# Patient Record
Sex: MALE | Race: OTHER | NOT HISPANIC OR LATINO | URBAN - METROPOLITAN AREA
[De-identification: names, ages, dates, MRNs, and addresses within clinical notes are randomized per-mention and may not be internally consistent; named-entity substitution may affect disease eponyms.]

---

## 2021-01-25 ENCOUNTER — EMERGENCY (EMERGENCY)
Facility: HOSPITAL | Age: 41
LOS: 1 days | Discharge: HOME | End: 2021-01-25
Payer: COMMERCIAL

## 2021-01-25 VITALS
RESPIRATION RATE: 20 BRPM | TEMPERATURE: 98 F | HEART RATE: 61 BPM | OXYGEN SATURATION: 98 % | SYSTOLIC BLOOD PRESSURE: 118 MMHG | DIASTOLIC BLOOD PRESSURE: 50 MMHG

## 2021-01-25 LAB — SARS-COV-2 RNA SPEC QL NAA+PROBE: SIGNIFICANT CHANGE UP

## 2021-01-25 PROCEDURE — U0003: CPT

## 2021-01-25 PROCEDURE — 99283 EMERGENCY DEPT VISIT LOW MDM: CPT

## 2021-01-25 PROCEDURE — 99282 EMERGENCY DEPT VISIT SF MDM: CPT

## 2021-01-25 PROCEDURE — U0005: CPT

## 2021-01-25 NOTE — ED PROVIDER NOTE - PATIENT PORTAL LINK FT
You can access the FollowMyHealth Patient Portal offered by [facility] by registering at the following website: [email or web address]. By joining [employer]’s FollowMyHealth portal, you will also be able to view your health information using other applications (apps) compatible with our system.

## 2021-01-25 NOTE — ED PROVIDER NOTE - OBJECTIVE STATEMENT
41yo M presenting for covid testing. Pt had positive exposure 5 days ago, states he was minimally around his sister but wants to get swabbed just to be safe. Pt feeling well, no symptoms. Denies fever, chills, cough, sob, cp, body aches, loss of smell/taste.

## 2021-01-25 NOTE — ED PROVIDER NOTE - NS ED ROS FT
Gen: denies fever, chills, fatigue, weight loss  Skin: denies rashes, laceration, bruising  HEENT: denies visual changes, ear pain, nasal congestion, throat pain  Respiratory: denies GOLDMAN, SOB, cough, wheezing  Cardiovascular: denies chest pain, palpitations, diaphoresis, LE edema  GI: denies abdominal pain, n/v/d

## 2021-02-12 ENCOUNTER — EMERGENCY (EMERGENCY)
Facility: HOSPITAL | Age: 41
LOS: 1 days | Discharge: HOME | End: 2021-02-12
Payer: COMMERCIAL

## 2021-02-12 VITALS
RESPIRATION RATE: 18 BRPM | TEMPERATURE: 98 F | SYSTOLIC BLOOD PRESSURE: 159 MMHG | DIASTOLIC BLOOD PRESSURE: 91 MMHG | HEART RATE: 78 BPM | OXYGEN SATURATION: 98 %

## 2021-02-12 LAB — SARS-COV-2 RNA SPEC QL NAA+PROBE: DETECTED

## 2021-02-12 PROCEDURE — U0003: CPT

## 2021-02-12 PROCEDURE — 99283 EMERGENCY DEPT VISIT LOW MDM: CPT

## 2021-02-12 PROCEDURE — 99282 EMERGENCY DEPT VISIT SF MDM: CPT

## 2021-02-12 PROCEDURE — U0005: CPT

## 2021-02-12 NOTE — ED PROVIDER NOTE - PATIENT PORTAL LINK FT
You can access the FollowMyHealth Patient Portal offered by [facility] by registering at the following website: [email or web address]. By joining [employer]’s FollowMyHealth portal, you will also be able to view your health information using other applications (apps) compatible with our system.

## 2021-02-12 NOTE — ED PROVIDER NOTE - OBJECTIVE STATEMENT
39 y/o M presents to ED for COVID testing. Pt currently asymptomatic. No chest pain, sob, cough, abd pain, n/v/d, headache, dizziness. + sick contacts. No recent travel. Pt well appearing. NAD.

## 2021-03-22 ENCOUNTER — EMERGENCY (EMERGENCY)
Facility: HOSPITAL | Age: 41
LOS: 1 days | Discharge: HOME | End: 2021-03-22
Attending: EMERGENCY MEDICINE
Payer: COMMERCIAL

## 2021-03-22 VITALS
SYSTOLIC BLOOD PRESSURE: 147 MMHG | HEART RATE: 63 BPM | OXYGEN SATURATION: 98 % | RESPIRATION RATE: 18 BRPM | DIASTOLIC BLOOD PRESSURE: 85 MMHG

## 2021-03-22 VITALS
OXYGEN SATURATION: 96 % | DIASTOLIC BLOOD PRESSURE: 98 MMHG | HEART RATE: 88 BPM | SYSTOLIC BLOOD PRESSURE: 164 MMHG | RESPIRATION RATE: 18 BRPM | WEIGHT: 225.09 LBS | TEMPERATURE: 98 F | HEIGHT: 73 IN

## 2021-03-22 PROBLEM — Z78.9 OTHER SPECIFIED HEALTH STATUS: Chronic | Status: ACTIVE | Noted: 2021-02-12

## 2021-03-22 LAB
ALBUMIN SERPL ELPH-MCNC: 4.6 G/DL — SIGNIFICANT CHANGE UP (ref 3.3–5.2)
ALP SERPL-CCNC: 86 U/L — SIGNIFICANT CHANGE UP (ref 40–120)
ALT FLD-CCNC: 29 U/L — SIGNIFICANT CHANGE UP
ANION GAP SERPL CALC-SCNC: 11 MMOL/L — SIGNIFICANT CHANGE UP (ref 5–17)
APTT BLD: 35.3 SEC — SIGNIFICANT CHANGE UP (ref 27.5–35.5)
AST SERPL-CCNC: 27 U/L — SIGNIFICANT CHANGE UP
BASOPHILS # BLD AUTO: 0.04 K/UL — SIGNIFICANT CHANGE UP (ref 0–0.2)
BASOPHILS NFR BLD AUTO: 0.8 % — SIGNIFICANT CHANGE UP (ref 0–2)
BILIRUB SERPL-MCNC: 0.8 MG/DL — SIGNIFICANT CHANGE UP (ref 0.4–2)
BUN SERPL-MCNC: 15 MG/DL — SIGNIFICANT CHANGE UP (ref 8–20)
CALCIUM SERPL-MCNC: 9.5 MG/DL — SIGNIFICANT CHANGE UP (ref 8.6–10.2)
CHLORIDE SERPL-SCNC: 101 MMOL/L — SIGNIFICANT CHANGE UP (ref 98–107)
CO2 SERPL-SCNC: 25 MMOL/L — SIGNIFICANT CHANGE UP (ref 22–29)
CREAT SERPL-MCNC: 1.01 MG/DL — SIGNIFICANT CHANGE UP (ref 0.5–1.3)
D DIMER BLD IA.RAPID-MCNC: <150 NG/ML DDU — SIGNIFICANT CHANGE UP
EOSINOPHIL # BLD AUTO: 0.03 K/UL — SIGNIFICANT CHANGE UP (ref 0–0.5)
EOSINOPHIL NFR BLD AUTO: 0.6 % — SIGNIFICANT CHANGE UP (ref 0–6)
GLUCOSE SERPL-MCNC: 104 MG/DL — HIGH (ref 70–99)
HCT VFR BLD CALC: 43 % — SIGNIFICANT CHANGE UP (ref 39–50)
HGB BLD-MCNC: 14.6 G/DL — SIGNIFICANT CHANGE UP (ref 13–17)
IMM GRANULOCYTES NFR BLD AUTO: 0.4 % — SIGNIFICANT CHANGE UP (ref 0–1.5)
INR BLD: 1.15 RATIO — SIGNIFICANT CHANGE UP (ref 0.88–1.16)
LYMPHOCYTES # BLD AUTO: 1.19 K/UL — SIGNIFICANT CHANGE UP (ref 1–3.3)
LYMPHOCYTES # BLD AUTO: 24.1 % — SIGNIFICANT CHANGE UP (ref 13–44)
MCHC RBC-ENTMCNC: 30.4 PG — SIGNIFICANT CHANGE UP (ref 27–34)
MCHC RBC-ENTMCNC: 34 GM/DL — SIGNIFICANT CHANGE UP (ref 32–36)
MCV RBC AUTO: 89.4 FL — SIGNIFICANT CHANGE UP (ref 80–100)
MONOCYTES # BLD AUTO: 0.36 K/UL — SIGNIFICANT CHANGE UP (ref 0–0.9)
MONOCYTES NFR BLD AUTO: 7.3 % — SIGNIFICANT CHANGE UP (ref 2–14)
NEUTROPHILS # BLD AUTO: 3.29 K/UL — SIGNIFICANT CHANGE UP (ref 1.8–7.4)
NEUTROPHILS NFR BLD AUTO: 66.8 % — SIGNIFICANT CHANGE UP (ref 43–77)
PLATELET # BLD AUTO: 264 K/UL — SIGNIFICANT CHANGE UP (ref 150–400)
POTASSIUM SERPL-MCNC: 4.8 MMOL/L — SIGNIFICANT CHANGE UP (ref 3.5–5.3)
POTASSIUM SERPL-SCNC: 4.8 MMOL/L — SIGNIFICANT CHANGE UP (ref 3.5–5.3)
PROT SERPL-MCNC: 7.4 G/DL — SIGNIFICANT CHANGE UP (ref 6.6–8.7)
PROTHROM AB SERPL-ACNC: 13.3 SEC — SIGNIFICANT CHANGE UP (ref 10.6–13.6)
RBC # BLD: 4.81 M/UL — SIGNIFICANT CHANGE UP (ref 4.2–5.8)
RBC # FLD: 12.1 % — SIGNIFICANT CHANGE UP (ref 10.3–14.5)
SODIUM SERPL-SCNC: 137 MMOL/L — SIGNIFICANT CHANGE UP (ref 135–145)
TROPONIN T SERPL-MCNC: <0.01 NG/ML — SIGNIFICANT CHANGE UP (ref 0–0.06)
WBC # BLD: 4.93 K/UL — SIGNIFICANT CHANGE UP (ref 3.8–10.5)
WBC # FLD AUTO: 4.93 K/UL — SIGNIFICANT CHANGE UP (ref 3.8–10.5)

## 2021-03-22 PROCEDURE — 85025 COMPLETE CBC W/AUTO DIFF WBC: CPT

## 2021-03-22 PROCEDURE — 85730 THROMBOPLASTIN TIME PARTIAL: CPT

## 2021-03-22 PROCEDURE — 36415 COLL VENOUS BLD VENIPUNCTURE: CPT

## 2021-03-22 PROCEDURE — 75574 CT ANGIO HRT W/3D IMAGE: CPT

## 2021-03-22 PROCEDURE — 93010 ELECTROCARDIOGRAM REPORT: CPT

## 2021-03-22 PROCEDURE — 80053 COMPREHEN METABOLIC PANEL: CPT

## 2021-03-22 PROCEDURE — 99285 EMERGENCY DEPT VISIT HI MDM: CPT

## 2021-03-22 PROCEDURE — 71045 X-RAY EXAM CHEST 1 VIEW: CPT | Mod: 26

## 2021-03-22 PROCEDURE — 93005 ELECTROCARDIOGRAM TRACING: CPT

## 2021-03-22 PROCEDURE — G1004: CPT

## 2021-03-22 PROCEDURE — 85652 RBC SED RATE AUTOMATED: CPT

## 2021-03-22 PROCEDURE — 84484 ASSAY OF TROPONIN QUANT: CPT

## 2021-03-22 PROCEDURE — 85610 PROTHROMBIN TIME: CPT

## 2021-03-22 PROCEDURE — 99284 EMERGENCY DEPT VISIT MOD MDM: CPT | Mod: 25

## 2021-03-22 PROCEDURE — 85379 FIBRIN DEGRADATION QUANT: CPT

## 2021-03-22 PROCEDURE — 75574 CT ANGIO HRT W/3D IMAGE: CPT | Mod: 26,ME

## 2021-03-22 PROCEDURE — 71045 X-RAY EXAM CHEST 1 VIEW: CPT

## 2021-03-22 PROCEDURE — 86140 C-REACTIVE PROTEIN: CPT

## 2021-03-22 RX ORDER — METOPROLOL TARTRATE 50 MG
50 TABLET ORAL ONCE
Refills: 0 | Status: COMPLETED | OUTPATIENT
Start: 2021-03-22 | End: 2021-03-22

## 2021-03-22 NOTE — ED ADULT NURSE NOTE - NSIMPLEMENTINTERV_GEN_ALL_ED
Implemented All Universal Safety Interventions:  [place] to call system. Call bell, personal items and telephone within reach. Instruct patient to call for assistance. Room bathroom lighting operational. Non-slip footwear when patient is off stretcher. Physically safe environment: no spills, clutter or unnecessary equipment. Stretcher in lowest position, wheels locked, appropriate side rails in place.

## 2021-03-22 NOTE — CONSULT NOTE ADULT - ATTENDING COMMENTS
chest pain : prior COVID. ESR and CRP and cardiac cta.  D dimer check.   if no significant coronary artery disease , then discharge

## 2021-03-22 NOTE — ED PROVIDER NOTE - PATIENT PORTAL LINK FT
You can access the FollowMyHealth Patient Portal offered by [facility] by registering at the following website: [email or web address]. By joining [employer]’s FollowMyHealth portal, you will also be able to view your health information using other applications (apps) compatible with our system.

## 2021-03-22 NOTE — ED PROVIDER NOTE - OBJECTIVE STATEMENT
40 year old male with no PMH presents with chest pain. Pt states that his pain started yesterday as an intermittent tightness in his chest. it robb snot raidate and has no alleviating/exacerbating factors. pt states that when he has the 40 year old male with no PMH presents with chest pain. Pt states that his pain started yesterday as an intermittent tightness in his chest. it does not radiate and has no alleviating/exacerbating factors. pt states that when he has the chest pain he feels dizzy and states that he "feels off". 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

## 2021-03-22 NOTE — CONSULT NOTE ADULT - ASSESSMENT
Pt is a 41 y/o male with medical history of recent COVID infection 2/2021 who presents to [facility]-ED for chest pain. Pt states that chest pain started yesterday lasted intermittently throughout the day. Chest Pain described as chest tightness/sharp, lasting 5 minutes in duration, left sided location no radiation with associated dizziness, increased cough and SOB. Pt unable to determine worsening factors-he states he feels pain at rest and with activity. In ED, ECG- NSR HR @ 80, Trop#1-negative, Xray-reading pending.    Chest Pain  -ECG-NSR HR @ 80  -Tele-NSR HR @ 60s with early repolarization  -Trop#1-negative  -Xray-reading pending  -ESR,CRP-ordered and pending if elevated consider diagnosis of pericarditis  -D-sjnvl-fdincpy and pending   -CTA Chest-pending r/o CAD

## 2021-03-22 NOTE — CONSULT NOTE ADULT - SUBJECTIVE AND OBJECTIVE BOX
[facility]                                                               Office:  [address] [address]                                                              Telephone: [phone]. Fax:[phone]                                                                        CARDIOLOGY CONSULTATION NOTE                                                                                             Consult requested by:  Dr. Tatum  Reason for Consultation: Chest Pain  History obtained by: Patient and medical record   obtained: No    Chief complaint:    Patient is a 40y old  Male who presents with a chief complaint of chest pain      HPI: Pt is a 39 y/o male with medical history of recent COVID infection 2/2021 who presents to [facility]ED for chest pain. Pt states that chest pain started yesterday lasted intermittently throughout the day. Chest Pain described as chest tightness/sharp, lasting 5 minutes in duration, left sided location no radiation with associated dizziness, increased cough and SOB. Pt unable to determine worsening factors-he states he feels pain at rest and with activity. In ED, ECG- NSR HR @ 80, Trop#1-negative, Xray-reading pending.        REVIEW OF SYMPTOMS:     CONSTITUTIONAL: No fever, weight loss, or fatigue  ENMT:  No difficulty hearing, tinnitus, vertigo; No sinus or throat pain  NECK: No pain or stiffness  CARDIOVASCULAR: See HPI  RESPIRATORY: No Dyspnea on exertion, Shortness of breath, cough, wheezing  : No dysuria, no hematuria   GI: No dark color stool, no melena, no diarrhea, no constipation, no abdominal pain   NEURO: No headache, no dizziness, no slurred speech   MUSCULOSKELETAL: No joint pain or swelling; No muscle, back, or extremity pain  PSYCH: No agitation, no anxiety.    ALL OTHER REVIEW OF SYSTEMS ARE NEGATIVE.      ALLERGIES: Allergies    No Known Allergies    Intolerances      PAST MEDICAL HISTORY  No pertinent past medical history        PAST SURGICAL HISTORY  No significant past surgical history        FAMILY HISTORY:  No contributing family hx-no hx of MI, stroke, heme disorders    SOCIAL HISTORY:    CIGARETTES: Denies    ALCOHOL: Occasional  DRUGS: Denies      CURRENT MEDICATIONS:  metoprolol tartrate 50 milliGRAM(s) Oral Once       HOME MEDICATIONS:  None    Vital Signs Last 24 Hrs  T(C): 36.7 (22 Mar 2021 10:07), Max: 36.7 (22 Mar 2021 10:07)  T(F): 98.1 (22 Mar 2021 10:07), Max: 98.1 (22 Mar 2021 10:07)  HR: 88 (22 Mar 2021 10:07) (88 - 88)  BP: 164/98 (22 Mar 2021 10:07) (164/98 - 164/98)  RR: 18 (22 Mar 2021 10:07) (18 - 18)  SpO2: 96% (22 Mar 2021 10:07) (96% - 96%)      PHYSICAL EXAM:  Constitutional: Comfortable . No acute distress.   HEENT: Atraumatic and normocephalic , neck is supple . no JVD. No carotid bruit. PEERL   CNS: A&Ox3. No focal deficits. EOMI.   Lymph Nodes: Cervical : Not palpable.  Respiratory: CTAB  Cardiovascular: S1S2 RRR. No murmur/rubs or gallop.  Gastrointestinal: Soft non-tender and non distended . +Bowel sounds. negative Garcia's sign.  Extremities: No edema.   Psychiatric: Calm . no agitation.  Skin: No skin rash/ulcers visualized to face, hands or feet.    Intake and output:     LABS:                        14.6   4.93  )-----------( 264      ( 22 Mar 2021 11:33 )             43.0     03-22    137  |  101  |  15.0  ----------------------------<  104<H>  4.8   |  25.0  |  1.01    Ca    9.5      22 Mar 2021 11:33    TPro  7.4  /  Alb  4.6  /  TBili  0.8  /  DBili  x   /  AST  27  /  ALT  29  /  AlkPhos  86  03-22    CARDIAC MARKERS ( 22 Mar 2021 11:33 )  x     / <0.01 ng/mL / x     / x     / x        ;p-BNP=  PT/INR - ( 22 Mar 2021 11:33 )   PT: 13.3 sec;   INR: 1.15 ratio         PTT - ( 22 Mar 2021 11:33 )  PTT:35.3 sec      INTERPRETATION OF TELEMETRY: NSR HR @ 60s with early repolarization  ECG: NSR HR @ 80    RADIOLOGY & ADDITIONAL STUDIES:    X-ray:   pending reading  CT scan: pending

## 2022-12-26 ENCOUNTER — APPOINTMENT (OUTPATIENT)
Dept: ORTHOPEDIC SURGERY | Facility: CLINIC | Age: 42
End: 2022-12-26

## 2022-12-26 ENCOUNTER — TRANSCRIPTION ENCOUNTER (OUTPATIENT)
Age: 42
End: 2022-12-26

## 2022-12-26 VITALS — WEIGHT: 220 LBS | BODY MASS INDEX: 29.8 KG/M2 | HEIGHT: 72 IN

## 2022-12-26 DIAGNOSIS — Z78.9 OTHER SPECIFIED HEALTH STATUS: ICD-10-CM

## 2022-12-26 DIAGNOSIS — M47.816 SPONDYLOSIS W/OUT MYELOPATHY OR RADICULOPATHY, LUMBAR REGION: ICD-10-CM

## 2022-12-26 PROBLEM — Z00.00 ENCOUNTER FOR PREVENTIVE HEALTH EXAMINATION: Status: ACTIVE | Noted: 2022-12-26

## 2022-12-26 PROCEDURE — 99204 OFFICE O/P NEW MOD 45 MIN: CPT

## 2022-12-26 NOTE — HISTORY OF PRESENT ILLNESS
[Gradual] : gradual [5] : 5 [Radiating] : radiating [Sharp] : sharp [Tightness] : tightness [Tingling] : tingling [Constant] : constant [Nothing helps with pain getting better] : Nothing helps with pain getting better [Full time] : Work status: full time [de-identified] : 12/26/2022 - 43 y/o male presenting for an initial evaluation of lumbar. Chief complaint is focal right sided low back pain present for approximately 3-4 years. Back pain is constant. No pain, numbness/tingling, or changes in strength in the lower extremities b/l. Treated with Trigger point injections and chiropractic care, which provided no significant relief. Difficulty with sitting, standing/ walking is more tolerable. Using nsaids prn, and LSO during heightened times of physical activity. Patient works for the [employer]. 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

## 2022-12-26 NOTE — PHYSICAL EXAM
[Flexion] : flexion [Extension] : extension [Normal Coordination] : normal coordination [Normal DTR UE/LE] : normal DTR UE/LE  [Normal Sensation] : normal sensation [Normal Mood and Affect] : normal mood and affect [Orientated] : orientated [Able to Communicate] : able to communicate [Normal Skin] : normal skin [No Rash] : no rash [No Ulcers] : no ulcers [No Lesions] : no lesions [No obvious lymphadenopathy in areas examined] : no obvious lymphadenopathy in areas examined [Well Developed] : well developed [Well Nourished] : well nourished [Peripheral vascular exam is grossly normal] : peripheral vascular exam is grossly normal [No Respiratory Distress] : no respiratory distress [de-identified] : Constitutional:\par - General Appearance:\par Unremarkable\par Body Habitus\par Well Developed\par Well Nourished\par Body Habitus\par No Deformities\par Well Groomed\par Ability To communicate:\par Normal\par Neurologic:\par Global sensation is intact to upper and lower extremities. See examination of Neck and/or Spine\par for exceptions.\par Orientation to Time, Place and Person is: Normal\par Mood And Affect is Normal\par Skin:\par - Head/Face, Right Upper/Lower Extremity, Left Upper/Lower Extremity: Normal\par See Examination of Neck and/or Spine for exceptions\par Cardiovascular:\par Peripheral Cardiovascular System is Normal\par Palpation of Lymph Nodes:\par Normal Palpation of lymph nodes in: Axilla, Cervical, Inguinal\par Abnormal Palpation of lymph nodes in: None  [] : clonus not sustained at ankle [TWNoteComboBox7] : forward flexion 45 degrees [de-identified] : extension 10 degrees

## 2022-12-26 NOTE — DISCUSSION/SUMMARY
[de-identified] : I advised the patient that I do not anticipate the findings on MRI to result in surgery. I am referring the patient to pain management to discuss trying MBB / possible RFA for back pain relief. Patient was advised to work on stretching, strengthening and range of motion. Patient was provided with a lumbar home exercise program to emphasize core strengthening. Advised patient use LSO at heightened times of physical demand. Discussed judicious use otc nsaids prn. F/U PRN. \par \par Prior to appointment and during encounter with patient extensive medical records were reviewed including but not limited to, hospital records, outpatient records, imaging results, and lab data.During this appointment the patient was examined, diagnoses were discussed and explained in a face to face manner. In addition extensive time was spent reviewing aforementioned diagnostic studies. Counseling including abnormal image results, differential diagnoses, treatment options, risk and benefits, lifestyle changes, current condition, and current medications was performed. Patient's comments, questions, and concerns were addressed and patient verbalized understanding. 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 Edmund MENJIVAR, Ingrid Benson, attest that this documentation has been prepared under the direction and in the presence of Provider Dariel Durand MD.

## 2025-02-18 ENCOUNTER — APPOINTMENT (OUTPATIENT)
Dept: NEUROLOGY | Facility: CLINIC | Age: 45
End: 2025-02-18